# Patient Record
Sex: FEMALE | Race: WHITE | URBAN - METROPOLITAN AREA
[De-identification: names, ages, dates, MRNs, and addresses within clinical notes are randomized per-mention and may not be internally consistent; named-entity substitution may affect disease eponyms.]

---

## 2020-02-11 ENCOUNTER — DOCTOR'S OFFICE (OUTPATIENT)
Dept: URBAN - METROPOLITAN AREA CLINIC 137 | Facility: CLINIC | Age: 30
Setting detail: OPHTHALMOLOGY
End: 2020-02-11
Payer: COMMERCIAL

## 2020-02-11 DIAGNOSIS — H35.81: ICD-10-CM

## 2020-02-11 DIAGNOSIS — H57.11: ICD-10-CM

## 2020-02-11 PROCEDURE — 99203 OFFICE O/P NEW LOW 30 MIN: CPT | Performed by: OPTOMETRIST

## 2020-02-11 ASSESSMENT — AXIALLENGTH_DERIVED
OD_AL: 29.0298
OS_AL: 28.1672

## 2020-02-11 ASSESSMENT — REFRACTION_AUTOREFRACTION
OS_AXIS: 159
OS_SPHERE: -10.50
OS_CYLINDER: -1.50
OD_CYLINDER: -2.25
OD_AXIS: 007
OD_SPHERE: -11.75

## 2020-02-11 ASSESSMENT — KERATOMETRY
OS_K2POWER_DIOPTERS: 45.50
OS_K1POWER_DIOPTERS: 44.50
OD_AXISANGLE_DEGREES: 002
OD_K1POWER_DIOPTERS: 44.25
OS_AXISANGLE_DEGREES: 170
OD_K2POWER_DIOPTERS: 46.00

## 2020-02-11 ASSESSMENT — VISUAL ACUITY
OD_BCVA: 20/25
OS_BCVA: 20/40-1

## 2020-02-11 ASSESSMENT — CONFRONTATIONAL VISUAL FIELD TEST (CVF)
OD_FINDINGS: FULL
OS_FINDINGS: FULL

## 2020-02-11 ASSESSMENT — SPHEQUIV_DERIVED
OS_SPHEQUIV: -11.25
OD_SPHEQUIV: -12.875

## 2020-02-17 ENCOUNTER — TELEPHONE (OUTPATIENT)
Dept: NEUROLOGY | Facility: CLINIC | Age: 30
End: 2020-02-17
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
## 2020-02-18 ENCOUNTER — DOCTOR'S OFFICE (OUTPATIENT)
Dept: URBAN - METROPOLITAN AREA CLINIC 137 | Facility: CLINIC | Age: 30
Setting detail: OPHTHALMOLOGY
End: 2020-02-18
Payer: COMMERCIAL

## 2020-02-18 ENCOUNTER — RX ONLY (RX ONLY)
Age: 30
End: 2020-02-18

## 2020-02-18 DIAGNOSIS — H57.11: ICD-10-CM

## 2020-02-18 DIAGNOSIS — H35.81: ICD-10-CM

## 2020-02-18 PROCEDURE — 99213 OFFICE O/P EST LOW 20 MIN: CPT | Performed by: OPTOMETRIST

## 2020-02-18 ASSESSMENT — AXIALLENGTH_DERIVED
OD_AL: 29.0298
OS_AL: 28.1672

## 2020-02-18 ASSESSMENT — REFRACTION_AUTOREFRACTION
OS_SPHERE: -10.50
OS_AXIS: 159
OD_CYLINDER: -2.25
OD_SPHERE: -11.75
OS_CYLINDER: -1.50
OD_AXIS: 007

## 2020-02-18 ASSESSMENT — KERATOMETRY
OD_AXISANGLE_DEGREES: 002
OD_K2POWER_DIOPTERS: 46.00
OS_AXISANGLE_DEGREES: 170
OS_K2POWER_DIOPTERS: 45.50
OS_K1POWER_DIOPTERS: 44.50
OD_K1POWER_DIOPTERS: 44.25

## 2020-02-18 ASSESSMENT — CONFRONTATIONAL VISUAL FIELD TEST (CVF)
OD_FINDINGS: FULL
OS_FINDINGS: FULL

## 2020-02-18 ASSESSMENT — SPHEQUIV_DERIVED
OD_SPHEQUIV: -12.875
OS_SPHEQUIV: -11.25

## 2020-02-18 ASSESSMENT — VISUAL ACUITY
OS_BCVA: 20/40-1
OD_BCVA: 20/30-2

## 2020-02-21 NOTE — TELEPHONE ENCOUNTER
[facility] Karlos Gravely at  [phone] x [phone]    Is the claim still open? What is the billing address? ?
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
## 2020-02-26 ENCOUNTER — CONSULT (OUTPATIENT)
Dept: NEUROLOGY | Facility: CLINIC | Age: 30
End: 2020-02-26
Payer: OTHER MISCELLANEOUS

## 2020-02-26 VITALS
SYSTOLIC BLOOD PRESSURE: 100 MMHG | WEIGHT: 127.5 LBS | DIASTOLIC BLOOD PRESSURE: 68 MMHG | RESPIRATION RATE: 14 BRPM | BODY MASS INDEX: 23.46 KG/M2 | HEART RATE: 68 BPM | HEIGHT: 62 IN

## 2020-02-26 DIAGNOSIS — S06.9X0D MILD TRAUMATIC BRAIN INJURY, WITHOUT LOSS OF CONSCIOUSNESS, SUBSEQUENT ENCOUNTER: Primary | ICD-10-CM

## 2020-02-26 DIAGNOSIS — G44.319 ACUTE POST-TRAUMATIC HEADACHE, NOT INTRACTABLE: ICD-10-CM

## 2020-02-26 PROCEDURE — 99204 OFFICE O/P NEW MOD 45 MIN: CPT | Performed by: PSYCHIATRY & NEUROLOGY

## 2020-02-26 RX ORDER — ONDANSETRON 4 MG/1
4 TABLET, ORALLY DISINTEGRATING ORAL EVERY 8 HOURS PRN
COMMUNITY
Start: 2020-02-10

## 2020-02-26 RX ORDER — PHENTERMINE HYDROCHLORIDE 37.5 MG/1
37.5 CAPSULE ORAL DAILY
COMMUNITY
Start: 2018-10-17

## 2020-02-26 RX ORDER — ESCITALOPRAM OXALATE 10 MG/1
10 TABLET ORAL DAILY
COMMUNITY
Start: 2016-11-22 | End: 2020-07-08

## 2020-02-26 RX ORDER — TOPIRAMATE 25 MG/1
25 TABLET ORAL 2 TIMES DAILY
COMMUNITY
Start: 2018-10-17

## 2020-02-26 RX ORDER — DESOGESTREL AND ETHINYL ESTRADIOL 0.15-0.03
KIT ORAL
COMMUNITY
Start: 2020-02-06

## 2020-02-26 RX ORDER — BUPROPION HYDROCHLORIDE 150 MG/1
TABLET ORAL
COMMUNITY
Start: 2020-01-19

## 2020-02-26 NOTE — PATIENT INSTRUCTIONS
Cognitive Plan:  Recommend return to work? [] No return at present    [] Yes, partial day   [x] Yes, full day without restrictions   [] N/A       Headache/migraine treatment:   Abortive medications (for immediate treatment of a headache): Ok to take ibuprofen or acetaminophen for headaches, but try to limit the amount and frequency that you are taking to avoid medication overuse/rebound headache   - no more than 3 days per week    Over the counter preventive supplements for headaches/migraines   (to take every day to help prevent headaches - not to take at the time of headache):  - Magnesium 400mg daily  - Can occasionally cause stomach upset - if so try at night, with food or stop, rarely can cause diarrhea if so stop  - Riboflavin (Vitamin B2) 400mg daily - FYI B2 may make your urine bright/neon yellow     Prescription preventive medications for headaches/migraines   (to take every day to help prevent headaches - not to take at the time of headache): Not indicated at this time    Sleep/headache prevention:  -  Melatonin - you may take 3 mg nightly for sleep  You should take this 1 hour prior to bedtime consistently every night for it to work  It works by gradually helping to adjust your sleep time over days to weeks, rather than immediately making you feel sleepy  -  If this does not work consider diphenhydramine or Benadryl 25 -50 mg       Lifestyle Recommendations:  - Maintain good sleep hygiene  Going to bed and waking up at consistent times, avoiding excessive daytime naps, avoiding caffeinated beverages in the evening, avoid excessive stimulation in the evening and generally using bed primarily for sleeping  One hour before bedtime would recommend turning lights down lower, decreasing your activity (may read quietly, listen to music at a low volume)  When you get into bed, should eliminate all technology (no texting, emailing, playing with your phone, iPad or tablet in bed)    - Maintain good hydration  Drink  2L of fluid a day (4 typical small water bottles)  - Maintain good nutrition  In particular don't skip meals and eat balanced meals regularly  Education and Follow-up  - Please contact us if any questions or concerns arise  Of course, try to protect yourself from head injuries, and if any new concerning symptoms or significant blow to the head or body go to the emergency department    - Follow up if needed

## 2020-02-26 NOTE — PROGRESS NOTES
Review of Systems   Constitutional: Negative  Negative for appetite change and fever  HENT: Negative  Negative for hearing loss, tinnitus, trouble swallowing and voice change  Eyes: Negative  Negative for photophobia and pain  Respiratory: Negative  Negative for shortness of breath  Cardiovascular: Negative  Negative for palpitations  Gastrointestinal: Negative  Negative for nausea and vomiting  Endocrine: Negative  Negative for cold intolerance and heat intolerance  Genitourinary: Negative  Negative for dysuria, frequency and urgency  Musculoskeletal: Negative  Negative for myalgias and neck pain  Skin: Negative  Negative for rash  Allergic/Immunologic: Negative  Neurological: Positive for headaches  Negative for dizziness, tremors, seizures, syncope, facial asymmetry, speech difficulty, weakness, light-headedness and numbness  Hematological: Negative  Does not bruise/bleed easily  Psychiatric/Behavioral: Negative  Negative for confusion, hallucinations and sleep disturbance